# Patient Record
Sex: MALE | Race: WHITE | NOT HISPANIC OR LATINO
[De-identification: names, ages, dates, MRNs, and addresses within clinical notes are randomized per-mention and may not be internally consistent; named-entity substitution may affect disease eponyms.]

---

## 2024-05-09 PROBLEM — Z00.00 ENCOUNTER FOR PREVENTIVE HEALTH EXAMINATION: Status: ACTIVE | Noted: 2024-05-09

## 2024-05-10 ENCOUNTER — NON-APPOINTMENT (OUTPATIENT)
Age: 34
End: 2024-05-10

## 2024-05-10 ENCOUNTER — APPOINTMENT (OUTPATIENT)
Dept: COLORECTAL SURGERY | Facility: CLINIC | Age: 34
End: 2024-05-10
Payer: COMMERCIAL

## 2024-05-10 VITALS
WEIGHT: 169 LBS | HEART RATE: 57 BPM | SYSTOLIC BLOOD PRESSURE: 119 MMHG | TEMPERATURE: 97.7 F | HEIGHT: 72 IN | BODY MASS INDEX: 22.89 KG/M2 | DIASTOLIC BLOOD PRESSURE: 81 MMHG

## 2024-05-10 DIAGNOSIS — F41.9 ANXIETY DISORDER, UNSPECIFIED: ICD-10-CM

## 2024-05-10 DIAGNOSIS — Z78.9 OTHER SPECIFIED HEALTH STATUS: ICD-10-CM

## 2024-05-10 DIAGNOSIS — Z83.511 FAMILY HISTORY OF GLAUCOMA: ICD-10-CM

## 2024-05-10 DIAGNOSIS — L29.0 PRURITUS ANI: ICD-10-CM

## 2024-05-10 DIAGNOSIS — H40.9 UNSPECIFIED GLAUCOMA: ICD-10-CM

## 2024-05-10 DIAGNOSIS — F32.A ANXIETY DISORDER, UNSPECIFIED: ICD-10-CM

## 2024-05-10 DIAGNOSIS — Z87.898 PERSONAL HISTORY OF OTHER SPECIFIED CONDITIONS: ICD-10-CM

## 2024-05-10 PROCEDURE — 46600 DIAGNOSTIC ANOSCOPY SPX: CPT

## 2024-05-10 PROCEDURE — 99202 OFFICE O/P NEW SF 15 MIN: CPT | Mod: 25

## 2024-05-10 RX ORDER — DORZOLAMIDE HYDROCHLORIDE 20 MG/ML
SOLUTION OPHTHALMIC
Refills: 0 | Status: ACTIVE | COMMUNITY

## 2024-05-10 RX ORDER — LATANOPROST/PF 0.005 %
0.01 DROPS OPHTHALMIC (EYE)
Refills: 0 | Status: ACTIVE | COMMUNITY

## 2024-05-10 RX ORDER — LAMOTRIGINE 200 MG/1
200 TABLET ORAL
Refills: 0 | Status: ACTIVE | COMMUNITY

## 2024-05-10 RX ORDER — FLUOCINOLONE ACETONIDE 0.25 MG/G
0.03 OINTMENT TOPICAL 3 TIMES DAILY
Qty: 1 | Refills: 1 | Status: ACTIVE | COMMUNITY
Start: 2024-05-10 | End: 1900-01-01

## 2024-05-10 NOTE — HISTORY OF PRESENT ILLNESS
[FreeTextEntry1] : 32 y/o M presents for initial evaluation of hemorrhoids  Referred by Dr. Mcgrath  PMH: Depression, Anxiety, Substance use d/o  PSH: Appendectomy, Varicocele, Ochiopexy, Hemorrhoid banding  FH: Denies CRC/IBD All: Denies Meds: Lamotrigine, Latanoporost, Dorzolomide Vaping  Never done Colonoscopy     Reports hx of hemorrhoids for many years. Previously seen by Dr. Gallo in [place], underwent 4x RBL has  last one February 2023, as per patient 6 months after symptoms came back.   Patient reports intermittent external tissue swelling and spotting w/ most BM noted on toilet paper. Patient states blood is not always due to harder BM. Last time he saw blood was scant amount on TP today.   Also reporting anal itching at night, wakes him up from his sleep. Does admit to aggressively itching at times. Has not used anything topical recently for symptoms. Drinks 1-2 cups a day, low intake of spicy foods. Patient reports due to external tissue swelling at times feels like he can't clean as properly so may over area multiple times with TP and wet wipes.  Patient saw Dr. Mcgraht yesterday who recommended Desitin and Sitz bath as well as incportating more fiber. Also referred to [facility]   BM: 1-2x a day, harder, small pieces. Incomplete evacuation +Admits to straining. +Admits to prolonged sitting. States previously on Psyllium Husk but didn't see much benefit. Reports moderate water intake.   Denies ASA/NSAIDs in the last 7 days.

## 2024-05-10 NOTE — PHYSICAL EXAM
[Excoriation] : excoriations [Skin Tags] : there were no residual hemorrhoidal skin tags seen [Normal] : was normal [None] : there was no rectal mass  [de-identified] : Erythema and fissuring of the anal margin skin. [FreeTextEntry1] : Medical assistant was present for the entire exam.  Anoscopy was performed for evaluation of the patients rectal bleeding  history . The risks, benefits and alternatives were reviewed.  A lighted anoscope was passed into the anal canal and the entire anal mucosal surface was inspected..   The findings revealed moderate internal hemorrhoids. No masses or lesions were identified.

## 2024-05-10 NOTE — ASSESSMENT
[FreeTextEntry1] : Advised the patient of the etiology and treatment of pruritus ani.  Recommendations including appropriate perianal hygiene changes, avoidance of soaps and astringents, lubricating lotions and avoidance of excessive wiping detailed.  Advised increasing fiber regimen.  A trial of topical steroid cream was recommended.  Advised return in 3-4 weeks if symptoms persist for perianal biopsy of the skin.